# Patient Record
Sex: MALE | Race: BLACK OR AFRICAN AMERICAN | Employment: OTHER | URBAN - METROPOLITAN AREA
[De-identification: names, ages, dates, MRNs, and addresses within clinical notes are randomized per-mention and may not be internally consistent; named-entity substitution may affect disease eponyms.]

---

## 2018-06-27 ENCOUNTER — OFFICE VISIT (OUTPATIENT)
Dept: CARDIOLOGY CLINIC | Age: 63
End: 2018-06-27

## 2018-06-27 VITALS — HEART RATE: 52 BPM | DIASTOLIC BLOOD PRESSURE: 76 MMHG | SYSTOLIC BLOOD PRESSURE: 110 MMHG | WEIGHT: 224.2 LBS

## 2018-06-27 DIAGNOSIS — R06.02 SOB (SHORTNESS OF BREATH): Primary | ICD-10-CM

## 2018-06-27 PROCEDURE — 99204 OFFICE O/P NEW MOD 45 MIN: CPT | Performed by: INTERNAL MEDICINE

## 2018-06-27 RX ORDER — ATORVASTATIN CALCIUM 40 MG/1
TABLET, FILM COATED ORAL
COMMUNITY
Start: 2018-03-21

## 2018-06-27 RX ORDER — AMLODIPINE BESYLATE 10 MG/1
TABLET ORAL
COMMUNITY
Start: 2018-03-21 | End: 2022-05-05

## 2018-06-27 RX ORDER — ASPIRIN 81 MG/1
TABLET, CHEWABLE ORAL
COMMUNITY
Start: 2018-03-21 | End: 2018-06-27 | Stop reason: SDUPTHER

## 2018-06-27 RX ORDER — HYDROCHLOROTHIAZIDE 25 MG/1
TABLET ORAL
COMMUNITY
Start: 2018-03-21

## 2018-06-27 RX ORDER — OXYCODONE HYDROCHLORIDE AND ACETAMINOPHEN 5; 325 MG/1; MG/1
TABLET ORAL
COMMUNITY
Start: 2018-03-26

## 2018-07-09 ENCOUNTER — HOSPITAL ENCOUNTER (OUTPATIENT)
Dept: NON INVASIVE DIAGNOSTICS | Age: 63
Discharge: HOME | End: 2018-07-09
Attending: INTERNAL MEDICINE | Admitting: INTERNAL MEDICINE

## 2018-07-09 DIAGNOSIS — R06.02 SOB (SHORTNESS OF BREATH): ICD-10-CM

## 2018-07-09 LAB
LV EF: 58 %
LVEF MODALITY: NORMAL

## 2018-07-30 ENCOUNTER — OFFICE VISIT (OUTPATIENT)
Dept: CARDIOLOGY CLINIC | Age: 63
End: 2018-07-30

## 2018-07-30 VITALS
WEIGHT: 221.2 LBS | HEART RATE: 64 BPM | DIASTOLIC BLOOD PRESSURE: 64 MMHG | SYSTOLIC BLOOD PRESSURE: 110 MMHG | BODY MASS INDEX: 29.31 KG/M2 | HEIGHT: 73 IN

## 2018-07-30 DIAGNOSIS — Z72.0 TOBACCO USE: ICD-10-CM

## 2018-07-30 DIAGNOSIS — I10 ESSENTIAL HYPERTENSION: ICD-10-CM

## 2018-07-30 DIAGNOSIS — E78.49 OTHER HYPERLIPIDEMIA: Primary | ICD-10-CM

## 2018-07-30 PROCEDURE — 3017F COLORECTAL CA SCREEN DOC REV: CPT | Performed by: NURSE PRACTITIONER

## 2018-07-30 PROCEDURE — G8427 DOCREV CUR MEDS BY ELIG CLIN: HCPCS | Performed by: NURSE PRACTITIONER

## 2018-07-30 PROCEDURE — G8419 CALC BMI OUT NRM PARAM NOF/U: HCPCS | Performed by: NURSE PRACTITIONER

## 2018-07-30 PROCEDURE — 99214 OFFICE O/P EST MOD 30 MIN: CPT | Performed by: NURSE PRACTITIONER

## 2018-07-30 PROCEDURE — 4004F PT TOBACCO SCREEN RCVD TLK: CPT | Performed by: NURSE PRACTITIONER

## 2018-07-30 NOTE — LETTER
· All other ROS are reviewed and are unremarkable. PHYSICAL EXAM:      Wt Readings from Last 3 Encounters:   07/30/18 221 lb 3.2 oz (100.3 kg)   06/27/18 224 lb 3.2 oz (101.7 kg)       BP Readings from Last 3 Encounters:   07/30/18 110/64   06/27/18 110/76       Pulse Readings from Last 3 Encounters:   07/30/18 64   06/27/18 52       Exam   ENT: neg   NEUROLOGIC:  Neg   PSYCH: neg  SKIN: Warm and dry. LUNGS:  No increased work of breathing and clear to auscultation, no crackles or wheezing  CARDIOVASCULAR: RRR soft systolic murmur LLSB   ABDOMEN: soft   EXT: no edema     Assessment:    soft systolic LLSB murmur   Had recent echo 7/11/18 EF 55-60%   Left ventricular cavity size is normal with asymmetric hypertrophy of the septum. Trivial tricuspid regurgitation  No c/o cp SOB edema / sleeps well  Hx back problems on disability      tobacco use   40pk year smoker     HTN  Controlled    HLD  Per PCP   On  Lipitor      Plan:   Patient education on touch screen in room   Discussed heart heathy lifestyle including nutrition, exercise & importance of nonsmoking,       No c/o cp SOB edema / Discussed stress test / long time tobacco use / he dose not want a stress test   Fu in 6 months with blood work per PCP       [address]     If you have questions, please do not hesitate to call me. I look forward to following Ron Garcia along with you.     Sincerely,        MANJEET Herrera - CNP

## 2018-07-30 NOTE — PROGRESS NOTES
[address]   Dr Kory Sneed. Jackie ARCEO, [address]                                                 Outpatient Follow Up Note      CC: fu with murmur / tobacco use 40pk year smoker / HTN controlled / HLD    Had recent echo 7/11/18 EF 55-60%   Left ventricular cavity size is normal with asymmetric hypertrophy of the septum. Trivial tricuspid regurgitation  No c/o cp SOB edema / sleeps well  Hx back problems on disability      07/30/18  HPI:  Sunday Ansari is 58 y.o. male who presents today with murmur      Discussed echo with no changes in his meds    PMH:    History reviewed. No pertinent past medical history. [address]  History reviewed. No pertinent surgical history. SH:       History   Smoking Status    Current Every Day Smoker   Smokeless Tobacco    Never Used     Current Medications:  Prior to Visit Medications    Medication Sig Taking? Authorizing Provider   aspirin 81 MG tablet Take 81 mg by mouth daily Yes Historical Provider, MD   amLODIPine (NORVASC) 10 MG tablet  Yes Historical Provider, MD   atorvastatin (LIPITOR) 40 MG tablet  Yes Historical Provider, MD   hydrochlorothiazide (HYDRODIURIL) 25 MG tablet  Yes Historical Provider, MD   oxyCODONE-acetaminophen (PERCOCET) 5-325 MG per tablet  Yes Historical Provider, MD   Cholecalciferol (VITAMIN D3) 5000 units TABS Take by mouth Yes Historical Provider, MD     Family History  History reviewed. No pertinent family history. · ROS:   · Cardiovascular: Reviewed in HPI  · Allergic/Immunologic: No nasal congestion or hives. · All other ROS are reviewed and are unremarkable.     PHYSICAL EXAM:      Wt Readings from Last 3 Encounters:   07/30/18 221 lb 3.2 oz (100.3 kg)   06/27/18 224 lb 3.2 oz (101.7 kg)       BP Readings from Last 3 Encounters:   07/30/18 110/64   06/27/18 110/76       Pulse Readings from Last 3 Encounters:   07/30/18 64   06/27/18 52       Exam   ENT: neg   NEUROLOGIC:  Neg   PSYCH: neg  SKIN: Warm and dry. LUNGS:  No increased work of breathing and clear to auscultation, no crackles or wheezing  CARDIOVASCULAR: RRR soft systolic murmur LLSB   ABDOMEN: soft   EXT: no edema     Assessment:    soft systolic LLSB murmur   Had recent echo 7/11/18 EF 55-60%   Left ventricular cavity size is normal with asymmetric hypertrophy of the septum. Trivial tricuspid regurgitation  No c/o cp SOB edema / sleeps well  Hx back problems on disability      tobacco use   40pk year smoker     HTN  Controlled    HLD  Per PCP   On  Lipitor      Plan:   Patient education on touch screen in room   Discussed heart heathy lifestyle including nutrition, exercise & importance of nonsmoking,       No c/o cp SOB edema / Discussed stress test / long time tobacco use / he dose not want a stress test   Fu in 6 months with blood work per PCP       [address]

## 2019-02-14 ENCOUNTER — OFFICE VISIT (OUTPATIENT)
Dept: CARDIOLOGY CLINIC | Age: 64
End: 2019-02-14
Payer: MEDICARE

## 2019-02-14 VITALS
HEART RATE: 59 BPM | BODY MASS INDEX: 30.77 KG/M2 | SYSTOLIC BLOOD PRESSURE: 118 MMHG | DIASTOLIC BLOOD PRESSURE: 80 MMHG | WEIGHT: 233.2 LBS

## 2019-02-14 DIAGNOSIS — E78.49 OTHER HYPERLIPIDEMIA: ICD-10-CM

## 2019-02-14 DIAGNOSIS — I10 ESSENTIAL HYPERTENSION: Primary | ICD-10-CM

## 2019-02-14 PROCEDURE — 3017F COLORECTAL CA SCREEN DOC REV: CPT | Performed by: INTERNAL MEDICINE

## 2019-02-14 PROCEDURE — 1123F ACP DISCUSS/DSCN MKR DOCD: CPT | Performed by: INTERNAL MEDICINE

## 2019-02-14 PROCEDURE — G8484 FLU IMMUNIZE NO ADMIN: HCPCS | Performed by: INTERNAL MEDICINE

## 2019-02-14 PROCEDURE — 4040F PNEUMOC VAC/ADMIN/RCVD: CPT | Performed by: INTERNAL MEDICINE

## 2019-02-14 PROCEDURE — G8417 CALC BMI ABV UP PARAM F/U: HCPCS | Performed by: INTERNAL MEDICINE

## 2019-02-14 PROCEDURE — 4004F PT TOBACCO SCREEN RCVD TLK: CPT | Performed by: INTERNAL MEDICINE

## 2019-02-14 PROCEDURE — 99214 OFFICE O/P EST MOD 30 MIN: CPT | Performed by: INTERNAL MEDICINE

## 2019-02-14 PROCEDURE — 1101F PT FALLS ASSESS-DOCD LE1/YR: CPT | Performed by: INTERNAL MEDICINE

## 2019-02-14 PROCEDURE — G8427 DOCREV CUR MEDS BY ELIG CLIN: HCPCS | Performed by: INTERNAL MEDICINE

## 2020-05-06 ENCOUNTER — HOSPITAL ENCOUNTER (OUTPATIENT)
Dept: ULTRASOUND IMAGING | Age: 65
Discharge: HOME | End: 2020-05-06
Payer: MEDICARE

## 2020-05-06 PROCEDURE — 76700 US EXAM ABDOM COMPLETE: CPT

## 2020-09-24 ENCOUNTER — OFFICE VISIT (OUTPATIENT)
Dept: CARDIOLOGY CLINIC | Age: 65
End: 2020-09-24
Payer: MEDICARE

## 2020-09-24 VITALS
WEIGHT: 210 LBS | DIASTOLIC BLOOD PRESSURE: 70 MMHG | HEIGHT: 73 IN | HEART RATE: 65 BPM | BODY MASS INDEX: 27.83 KG/M2 | TEMPERATURE: 98.4 F | SYSTOLIC BLOOD PRESSURE: 129 MMHG

## 2020-09-24 PROCEDURE — 1123F ACP DISCUSS/DSCN MKR DOCD: CPT | Performed by: NURSE PRACTITIONER

## 2020-09-24 PROCEDURE — G8427 DOCREV CUR MEDS BY ELIG CLIN: HCPCS | Performed by: NURSE PRACTITIONER

## 2020-09-24 PROCEDURE — 99214 OFFICE O/P EST MOD 30 MIN: CPT | Performed by: NURSE PRACTITIONER

## 2020-09-24 PROCEDURE — 4040F PNEUMOC VAC/ADMIN/RCVD: CPT | Performed by: NURSE PRACTITIONER

## 2020-09-24 PROCEDURE — 3017F COLORECTAL CA SCREEN DOC REV: CPT | Performed by: NURSE PRACTITIONER

## 2020-09-24 PROCEDURE — 4004F PT TOBACCO SCREEN RCVD TLK: CPT | Performed by: NURSE PRACTITIONER

## 2020-09-24 PROCEDURE — G8417 CALC BMI ABV UP PARAM F/U: HCPCS | Performed by: NURSE PRACTITIONER

## 2020-09-24 NOTE — PROGRESS NOTES
[facility]  Office Visit           Jamila Fowler MD,  [address] FNP CVNP       Cardiology             Chari Bradshaw  [birth year]    Primary Cardiologist:  Migdalia Boston       CC:Interval Hx: Mr. Richy Brooks is here with c/o atypical pain in right arm, wax & wanes at rest  No c/o cp or back or left arm or neck pain   No SOB, no c/o nausea vomiting diarrhea h/a fever cough PND CRISTOBAL this has occurred over 1.5 months  Pain eases when lifting something / he feels it has something with his \"funny bone\" after stress test recommend to go to ortho     PMH: tobacco use cigars now    HLD controlled    Had recent echo 7/11/18 EF 55-60%   Left ventricular cavity size is normal with asymmetric hypertrophy of the septum. Trivial tricuspid regurgitation  No c/o cp SOB edema / sleeps well  Hx back problems on disability    I have examined pt and reviewed notes / any lab work EKGs stress test, angiograms, & images reviewed  I  have spent >20 minutes of face to face time with the patient with more than 50% spent counseling and coordinating care this patient. Review of Systems:  Constitutional: Denies  fatigue, weakness, night sweats or fever. HEENT: Denies new visual changes, ringing in ears, nosebleeds,nasal congestion  Respiratory: Denies new or change in SOB, PND, orthopnea or cough. Cardiovascular: see HPI  GI: Denies N/V, diarrhea, constipation, abdominal pain, change in bowel habits, melena or hematochezia  : Denies urinary frequency, urgency, incontinence, hematuria or dysuria. Skin: Denies rash, hives, or cyanosis  Musculoskeletal: Denies joint or muscle aches/pain  Neurological: Denies syncope or TIA-like symptoms. Psychiatric: Denies anxiety, insomnia or depression     No past medical history on file. No past surgical history on file. No family history on file.   Social History     Tobacco Use    Smoking status: Current Every Day Smoker  Smokeless tobacco: Never Used   Substance Use Topics    Alcohol use: Not on file    Drug use: Not on file       No Known Allergies  Current Outpatient Medications   Medication Sig Dispense Refill    aspirin 81 MG tablet Take 81 mg by mouth daily      amLODIPine (NORVASC) 10 MG tablet       atorvastatin (LIPITOR) 40 MG tablet       hydrochlorothiazide (HYDRODIURIL) 25 MG tablet       oxyCODONE-acetaminophen (PERCOCET) 5-325 MG per tablet       Cholecalciferol (VITAMIN D3) 5000 units TABS Take by mouth       No current facility-administered medications for this visit. Physical Exam:   /70   Pulse 65   Temp 98.4 °F (36.9 °C)   Ht 6' 1\" (1.854 m)   Wt 210 lb (95.3 kg)   BMI 27.71 kg/m²   BP Readings from Last 3 Encounters:   09/24/20 129/70   02/14/19 118/80   07/30/18 110/64     Pulse Readings from Last 3 Encounters:   09/24/20 65   02/14/19 59   07/30/18 64     Wt Readings from Last 3 Encounters:   09/24/20 210 lb (95.3 kg)   02/14/19 233 lb 3.2 oz (105.8 kg)   07/30/18 221 lb 3.2 oz (100.3 kg)     Constitutional: He is oriented to person, place, and time. He appears well-developed and well-nourished. In no acute distress. HEENT: Normocephalic and atraumatic. Sclerae anicteric. No xanthelasmas. Conjunctiva white, no subconjunctival hemorrhage   External inspection of ears nose teeth & gums   Eyes:PERRLA EOM's intact. Neck: Neck supple. No JVD present. Carotids without bruits. No mass and no thyromegaly present. No lymphadenopathy present. Cardiovascular: RRR, normal S1 and S2; soft murmur/gallop or rub, PMI nondisplaced  Pulmonary/Chest: Effort normal.  Lungs clear to auscultation. Chest wall nontender  Abdominal: soft, nontender, nondistended. + bowel sounds; no organomegaly or bruits. Aorta normal  Extremities: No edema, cyanosis, or clubbing. Pulses are 2+ radial/carotid/dorsalis pedis bilaterally. Cap refill brisk. Neurological: No cranial nerve deficit.    Psychiatric: He has a normal mood and affect.  His speech is normal and behavior is normal.     I have reviewed any available labs, images, any stress test, [facility] on this pt       Assessment:    Mr. Janeth Bowers is here with c/o atypical pain in right arm, wax & wanes at rest  Pain eases when lifting something / he feels it has something with his \"funny bone\" after stress test recommend to go to ortho   No c/o cp or back or left arm or neck pain   No SOB, no c/o nausea vomiting diarrhea h/a fever cough PND OSAthis has occurred over 1.5 months      tobacco use cigars now   Discussed cessation     HLD controlled    Plan:  Deanna Guardian for atypical pain with hx tobacco use / HTN HLD  Us carotid hx heavy tobacco use   Echo has soft murmur   Blood work  Fu in 3-4 weeks       Time OTONIEL Garcia

## 2020-09-24 NOTE — PATIENT INSTRUCTIONS
[facility] for atypical pain with hx tobacco use / HTN HLD  Us carotid hx heavy tobacco use   Echo has soft murmur   Blood work  Fu in 3-4 weeks

## 2020-10-02 ENCOUNTER — HOSPITAL ENCOUNTER (OUTPATIENT)
Dept: NON INVASIVE DIAGNOSTICS | Age: 65
Discharge: HOME | End: 2020-10-02
Payer: MEDICARE

## 2020-10-02 ENCOUNTER — HOSPITAL ENCOUNTER (OUTPATIENT)
Dept: VASCULAR LAB | Age: 65
Discharge: HOME | End: 2020-10-02
Payer: MEDICARE

## 2020-10-02 LAB
LV EF: 58 %
LV EF: 63 %
LVEF MODALITY: NORMAL
LVEF MODALITY: NORMAL

## 2020-10-02 PROCEDURE — A9502 TC99M TETROFOSMIN: HCPCS | Performed by: NURSE PRACTITIONER

## 2020-10-02 PROCEDURE — 2580000003 HC RX 258: Performed by: NURSE PRACTITIONER

## 2020-10-02 PROCEDURE — 93017 CV STRESS TEST TRACING ONLY: CPT

## 2020-10-02 PROCEDURE — 93306 TTE W/DOPPLER COMPLETE: CPT

## 2020-10-02 PROCEDURE — 78452 HT MUSCLE IMAGE SPECT MULT: CPT

## 2020-10-02 PROCEDURE — 3430000000 HC RX DIAGNOSTIC RADIOPHARMACEUTICAL: Performed by: NURSE PRACTITIONER

## 2020-10-02 PROCEDURE — 93880 EXTRACRANIAL BILAT STUDY: CPT

## 2020-10-02 PROCEDURE — 6360000002 HC RX W HCPCS: Performed by: NURSE PRACTITIONER

## 2020-10-02 RX ORDER — SODIUM CHLORIDE 0.9 % (FLUSH) 0.9 %
10 SYRINGE (ML) INJECTION 2 TIMES DAILY
Status: DISCONTINUED | OUTPATIENT
Start: 2020-10-02 | End: 2020-10-03 | Stop reason: HOSPADM

## 2020-10-02 RX ADMIN — Medication 10 ML: at 09:20

## 2020-10-02 RX ADMIN — REGADENOSON 0.4 MG: 0.08 INJECTION, SOLUTION INTRAVENOUS at 10:30

## 2020-10-02 RX ADMIN — TETROFOSMIN 10 MILLICURIE: 1.38 INJECTION, POWDER, LYOPHILIZED, FOR SOLUTION INTRAVENOUS at 09:20

## 2020-10-02 RX ADMIN — Medication 10 ML: at 10:30

## 2020-10-02 RX ADMIN — TETROFOSMIN 30.1 MILLICURIE: 1.38 INJECTION, POWDER, LYOPHILIZED, FOR SOLUTION INTRAVENOUS at 10:30

## 2020-10-29 ENCOUNTER — OFFICE VISIT (OUTPATIENT)
Dept: CARDIOLOGY CLINIC | Age: 65
End: 2020-10-29
Payer: MEDICARE

## 2020-10-29 VITALS
DIASTOLIC BLOOD PRESSURE: 78 MMHG | HEIGHT: 72 IN | HEART RATE: 79 BPM | TEMPERATURE: 96.4 F | WEIGHT: 207 LBS | SYSTOLIC BLOOD PRESSURE: 116 MMHG | BODY MASS INDEX: 28.04 KG/M2

## 2020-10-29 DIAGNOSIS — E55.9 VITAMIN D DEFICIENCY: ICD-10-CM

## 2020-10-29 DIAGNOSIS — Z72.0 TOBACCO USE: ICD-10-CM

## 2020-10-29 DIAGNOSIS — I10 ESSENTIAL HYPERTENSION: ICD-10-CM

## 2020-10-29 LAB
A/G RATIO: 1.5 (ref 1.1–2.2)
ALBUMIN SERPL-MCNC: 4.3 G/DL (ref 3.4–5)
ALP BLD-CCNC: 62 U/L (ref 40–129)
ALT SERPL-CCNC: 24 U/L (ref 10–40)
ANION GAP SERPL CALCULATED.3IONS-SCNC: 10 MMOL/L (ref 3–16)
AST SERPL-CCNC: 15 U/L (ref 15–37)
BILIRUB SERPL-MCNC: 0.4 MG/DL (ref 0–1)
BILIRUBIN DIRECT: <0.2 MG/DL (ref 0–0.3)
BILIRUBIN, INDIRECT: NORMAL MG/DL (ref 0–1)
BUN BLDV-MCNC: 12 MG/DL (ref 7–20)
CALCIUM SERPL-MCNC: 9.5 MG/DL (ref 8.3–10.6)
CHLORIDE BLD-SCNC: 103 MMOL/L (ref 99–110)
CHOLESTEROL, TOTAL: 145 MG/DL (ref 0–199)
CO2: 29 MMOL/L (ref 21–32)
CREAT SERPL-MCNC: 0.8 MG/DL (ref 0.8–1.3)
GFR AFRICAN AMERICAN: >60
GFR NON-AFRICAN AMERICAN: >60
GLOBULIN: 2.8 G/DL
GLUCOSE BLD-MCNC: 86 MG/DL (ref 70–99)
HCT VFR BLD CALC: 52.1 % (ref 40.5–52.5)
HDLC SERPL-MCNC: 41 MG/DL (ref 40–60)
HEMOGLOBIN: 17.4 G/DL (ref 13.5–17.5)
LDL CHOLESTEROL CALCULATED: 84 MG/DL
MAGNESIUM: 2.8 MG/DL (ref 1.8–2.4)
MCH RBC QN AUTO: 30.9 PG (ref 26–34)
MCHC RBC AUTO-ENTMCNC: 33.4 G/DL (ref 31–36)
MCV RBC AUTO: 92.4 FL (ref 80–100)
PDW BLD-RTO: 13.8 % (ref 12.4–15.4)
PLATELET # BLD: 214 K/UL (ref 135–450)
PMV BLD AUTO: 8.2 FL (ref 5–10.5)
POTASSIUM SERPL-SCNC: 4.9 MMOL/L (ref 3.5–5.1)
RBC # BLD: 5.63 M/UL (ref 4.2–5.9)
SODIUM BLD-SCNC: 142 MMOL/L (ref 136–145)
TOTAL PROTEIN: 7.1 G/DL (ref 6.4–8.2)
TRIGL SERPL-MCNC: 99 MG/DL (ref 0–150)
TSH REFLEX FT4: 1.02 UIU/ML (ref 0.27–4.2)
VITAMIN D 25-HYDROXY: 53.1 NG/ML
VLDLC SERPL CALC-MCNC: 20 MG/DL
WBC # BLD: 10 K/UL (ref 4–11)

## 2020-10-29 PROCEDURE — 4004F PT TOBACCO SCREEN RCVD TLK: CPT | Performed by: NURSE PRACTITIONER

## 2020-10-29 PROCEDURE — G8427 DOCREV CUR MEDS BY ELIG CLIN: HCPCS | Performed by: NURSE PRACTITIONER

## 2020-10-29 PROCEDURE — 4040F PNEUMOC VAC/ADMIN/RCVD: CPT | Performed by: NURSE PRACTITIONER

## 2020-10-29 PROCEDURE — G8484 FLU IMMUNIZE NO ADMIN: HCPCS | Performed by: NURSE PRACTITIONER

## 2020-10-29 PROCEDURE — 99214 OFFICE O/P EST MOD 30 MIN: CPT | Performed by: NURSE PRACTITIONER

## 2020-10-29 PROCEDURE — 1123F ACP DISCUSS/DSCN MKR DOCD: CPT | Performed by: NURSE PRACTITIONER

## 2020-10-29 PROCEDURE — G8417 CALC BMI ABV UP PARAM F/U: HCPCS | Performed by: NURSE PRACTITIONER

## 2020-10-29 PROCEDURE — 3017F COLORECTAL CA SCREEN DOC REV: CPT | Performed by: NURSE PRACTITIONER

## 2020-10-29 NOTE — PROGRESS NOTES
[facility]  Office Visit           Pete Cortez MD,  [address] [facility] FNP CVNP       Cardiology             Liana Ryan  1955 October 29, 2020    Primary Cardiologist:  Deirdre Lee     CC:Interval Hx: Mr Ananya Valente is here after testing for c/o chest  / hx of shoulder pain / remote tobacco use    VSS wt stable no c/o angina /SOB edema  sleeps well / denies PND or CRISTOBAL  Discussed results for test / no changes in care     PMH: tobacco use cigars now    HLD controlled     us carotid neg for sign stenosis    Stress test 10/20    Overall findings represent a low risk scan.     There is normal isotope uptake at stress and rest. There is no evidence of     myocardial ischemia or scar.     Normal LV function.     Normal myocardial perfusion study.  ECG: Non-diagnostic EKG response due to failure to reach target heart rate     TTE 10/20 Normal left ventricle size with mild to moderate asymmetric left ventricular   hypertrophy. Overall left ventricular systolic function appears normal with an ejection   fraction of 55-60%. No regional wall motion abnormalities   Diastolic filling parameters suggests normal diastolic function. Trace mitral and trcuspid regurgitation   Estimated pulmonary artery systolic pressure is 32 mmHg assuming a right   atrial pressure of 3 mmHg. The right atrium is dilated. Review of Systems:  Constitutional: Denies  fatigue, weakness, night sweats or fever. HEENT: Denies new visual changes, ringing in ears, nosebleeds,nasal congestion  Respiratory: Denies new or change in SOB, PND, orthopnea or cough. Cardiovascular: see HPI  GI: Denies N/V, diarrhea, constipation, abdominal pain, change in bowel habits, melena or hematochezia  : Denies urinary frequency, urgency, incontinence, hematuria or dysuria.   Skin: Denies rash, hives, or cyanosis  Musculoskeletal: Denies joint or muscle aches/pain  Neurological: Denies syncope or TIA-like symptoms. Psychiatric: Denies anxiety, insomnia or depression       No Known Allergies  Current Outpatient Medications   Medication Sig Dispense Refill    aspirin 81 MG tablet Take 81 mg by mouth daily      amLODIPine (NORVASC) 10 MG tablet       atorvastatin (LIPITOR) 40 MG tablet       hydrochlorothiazide (HYDRODIURIL) 25 MG tablet       oxyCODONE-acetaminophen (PERCOCET) 5-325 MG per tablet       Cholecalciferol (VITAMIN D3) 5000 units TABS Take by mouth       No current facility-administered medications for this visit. Physical Exam:   /78   Pulse 79   Temp 96.4 °F (35.8 °C)   Ht 6' (1.829 m)   Wt 207 lb (93.9 kg)   BMI 28.07 kg/m²   BP Readings from Last 3 Encounters:   10/29/20 116/78   09/24/20 129/70   02/14/19 118/80     Pulse Readings from Last 3 Encounters:   10/29/20 79   09/24/20 65   02/14/19 59     Wt Readings from Last 3 Encounters:   10/29/20 207 lb (93.9 kg)   09/24/20 210 lb (95.3 kg)   02/14/19 233 lb 3.2 oz (105.8 kg)     Constitutional: He is oriented to person, place, and time. He appears well-developed and well-nourished. In no acute distress. HEENT: Normocephalic and atraumatic. Sclerae anicteric. No xanthelasmas. Conjunctiva white, no subconjunctival hemorrhage   External inspection of ears nose teeth & gums   Eyes:PERRLA EOM's intact. Neck: Neck supple. No JVD present. Carotids without bruits. No mass and no thyromegaly present. No lymphadenopathy present. Cardiovascular: RRR, normal S1 and S2; no murmur/gallop or rub, PMI nondisplaced  Pulmonary/Chest: Effort normal.  Lungs clear to auscultation. Chest wall nontender  Abdominal: soft, nontender, nondistended. + bowel sounds; no organomegaly or bruits. Aorta normal  Extremities: No edema, cyanosis, or clubbing. Pulses are 2+ radial/carotid/dorsalis pedis bilaterally. Cap refill brisk. Neurological: No cranial nerve deficit. Psychiatric: He has a normal mood and affect.  His speech is normal and behavior is normal.     I have reviewed any available labs, images, any stress test, [facility] on this pt       Assessment:    Mr Elizabeth Lew is here after testing for c/o chest  / hx of shoulder pain / remote tobacco use      complaint with meds   Discussed nutrition / sodium intake / fluid intake   Recommend activity as tolerated     Stress test 10/20    Overall findings represent a low risk scan.     There is normal isotope uptake at stress and rest. There is no evidence of     myocardial ischemia or scar.     Normal LV function.     Normal myocardial perfusion study.  ECG: Non-diagnostic EKG response due to failure to reach target heart rate     TTE 10/20 Normal left ventricle size with mild to moderate asymmetric left ventricular   hypertrophy. Overall left ventricular systolic function appears normal with an ejection   fraction of 55-60%. No regional wall motion abnormalities   Diastolic filling parameters suggests normal diastolic function. Trace mitral and trcuspid regurgitation   Estimated pulmonary artery systolic pressure is 32 mmHg assuming a right   atrial pressure of 3 mmHg. The right atrium is dilated.     tobacco use   cigars now    discussed cessation     HLD   controlled     Hx heavy tobacco use  us carotid neg for sign stenosis      Plan:  Fu in 6 months with blood work       Ernesto Conception APRN,CVNP

## 2021-04-29 ENCOUNTER — OFFICE VISIT (OUTPATIENT)
Dept: CARDIOLOGY CLINIC | Age: 66
End: 2021-04-29
Payer: MEDICARE

## 2021-04-29 VITALS
WEIGHT: 214.2 LBS | HEIGHT: 73 IN | SYSTOLIC BLOOD PRESSURE: 110 MMHG | DIASTOLIC BLOOD PRESSURE: 70 MMHG | HEART RATE: 84 BPM | BODY MASS INDEX: 28.39 KG/M2

## 2021-04-29 DIAGNOSIS — I10 ESSENTIAL HYPERTENSION: Primary | ICD-10-CM

## 2021-04-29 DIAGNOSIS — I10 ESSENTIAL HYPERTENSION: ICD-10-CM

## 2021-04-29 LAB
A/G RATIO: 1.6 (ref 1.1–2.2)
ALBUMIN SERPL-MCNC: 4.3 G/DL (ref 3.4–5)
ALP BLD-CCNC: 50 U/L (ref 40–129)
ALT SERPL-CCNC: 22 U/L (ref 10–40)
ANION GAP SERPL CALCULATED.3IONS-SCNC: 10 MMOL/L (ref 3–16)
AST SERPL-CCNC: 23 U/L (ref 15–37)
BILIRUB SERPL-MCNC: 0.8 MG/DL (ref 0–1)
BILIRUBIN DIRECT: <0.2 MG/DL (ref 0–0.3)
BILIRUBIN, INDIRECT: NORMAL MG/DL (ref 0–1)
BUN BLDV-MCNC: 16 MG/DL (ref 7–20)
CALCIUM SERPL-MCNC: 9.2 MG/DL (ref 8.3–10.6)
CHLORIDE BLD-SCNC: 103 MMOL/L (ref 99–110)
CHOLESTEROL, TOTAL: 134 MG/DL (ref 0–199)
CO2: 32 MMOL/L (ref 21–32)
CREAT SERPL-MCNC: 0.9 MG/DL (ref 0.8–1.3)
GFR AFRICAN AMERICAN: >60
GFR NON-AFRICAN AMERICAN: >60
GLOBULIN: 2.7 G/DL
GLUCOSE BLD-MCNC: 90 MG/DL (ref 70–99)
HCT VFR BLD CALC: 49 % (ref 40.5–52.5)
HDLC SERPL-MCNC: 39 MG/DL (ref 40–60)
HEMOGLOBIN: 16.6 G/DL (ref 13.5–17.5)
LDL CHOLESTEROL CALCULATED: 77 MG/DL
MAGNESIUM: 2.2 MG/DL (ref 1.8–2.4)
MCH RBC QN AUTO: 31.2 PG (ref 26–34)
MCHC RBC AUTO-ENTMCNC: 33.8 G/DL (ref 31–36)
MCV RBC AUTO: 92.1 FL (ref 80–100)
PDW BLD-RTO: 14.3 % (ref 12.4–15.4)
PLATELET # BLD: 204 K/UL (ref 135–450)
PMV BLD AUTO: 8.4 FL (ref 5–10.5)
POTASSIUM SERPL-SCNC: 4.1 MMOL/L (ref 3.5–5.1)
RBC # BLD: 5.32 M/UL (ref 4.2–5.9)
SODIUM BLD-SCNC: 145 MMOL/L (ref 136–145)
TOTAL PROTEIN: 7 G/DL (ref 6.4–8.2)
TRIGL SERPL-MCNC: 91 MG/DL (ref 0–150)
TSH REFLEX FT4: 0.81 UIU/ML (ref 0.27–4.2)
VITAMIN D 25-HYDROXY: 51.4 NG/ML
VLDLC SERPL CALC-MCNC: 18 MG/DL
WBC # BLD: 10.5 K/UL (ref 4–11)

## 2021-04-29 PROCEDURE — 4004F PT TOBACCO SCREEN RCVD TLK: CPT | Performed by: NURSE PRACTITIONER

## 2021-04-29 PROCEDURE — G8427 DOCREV CUR MEDS BY ELIG CLIN: HCPCS | Performed by: NURSE PRACTITIONER

## 2021-04-29 PROCEDURE — 99214 OFFICE O/P EST MOD 30 MIN: CPT | Performed by: NURSE PRACTITIONER

## 2021-04-29 PROCEDURE — 1123F ACP DISCUSS/DSCN MKR DOCD: CPT | Performed by: NURSE PRACTITIONER

## 2021-04-29 PROCEDURE — G8417 CALC BMI ABV UP PARAM F/U: HCPCS | Performed by: NURSE PRACTITIONER

## 2021-04-29 PROCEDURE — 3017F COLORECTAL CA SCREEN DOC REV: CPT | Performed by: NURSE PRACTITIONER

## 2021-04-29 PROCEDURE — 4040F PNEUMOC VAC/ADMIN/RCVD: CPT | Performed by: NURSE PRACTITIONER

## 2021-04-29 NOTE — PROGRESS NOTES
depression        Objective:   PHYSICAL EXAM:        Vitals:    04/29/21 0954   BP: 110/70   Pulse: 84   Weight: 214 lb 3.2 oz (97.2 kg)   Height: 6' 1\" (1.854 m)      VITALS:  /70   Pulse 84   Ht 6' 1\" (1.854 m)   Wt 214 lb 3.2 oz (97.2 kg)   BMI 28.26 kg/m²   CONSTITUTIONAL: Cooperative, no apparent distress, and appears well nourished / developed  NEUROLOGIC:  Awake and orientated to person, place, and time. PSYCH: Calm affect. SKIN: Warm and dry. HEENT: Sclera non-icteric, normocephalic, neck supple. RESPIRATORY:  No increased work of breathing and clear to auscultation, no crackles or wheezing. CARDIOVASCULAR:  Regular rate and rhythm without murmur. Normal S1 and S2. No gallops or rubs. Normal PMI. No elevation of JVP. Normal carotid pulses with no bruits. GI:  Normal bowel sounds. Non-distended. Non-tender to palpation  EXT: No edema. No calf tenderness. Pulses are present bilaterally.     Wt Readings from Last 3 Encounters:   04/29/21 214 lb 3.2 oz (97.2 kg)   10/29/20 207 lb (93.9 kg)   09/24/20 210 lb (95.3 kg)      Pulse Readings from Last 3 Encounters:   04/29/21 84   10/29/20 79   09/24/20 65     BP Readings from Last 3 Encounters:   04/29/21 110/70   10/29/20 116/78   09/24/20 129/70        DATA:    Lab Results   Component Value Date    ALT 24 10/29/2020    AST 15 10/29/2020    ALKPHOS 62 10/29/2020    BILITOT 0.4 10/29/2020     Lab Results   Component Value Date    CREATININE 0.8 10/29/2020    BUN 12 10/29/2020     10/29/2020    K 4.9 10/29/2020     10/29/2020    CO2 29 10/29/2020     Lab Results   Component Value Date    WBC 10.0 10/29/2020    HGB 17.4 10/29/2020    HCT 52.1 10/29/2020    MCV 92.4 10/29/2020     10/29/2020     No components found for: CHLPL  Lab Results   Component Value Date    TRIG 99 10/29/2020     Lab Results   Component Value Date    HDL 41 10/29/2020     Lab Results   Component Value Date    LDLCALC 84 10/29/2020     Lab Results   Component Value Date    LABVLDL 20 10/29/2020      No results found for: BNP  Radiology Review:  Pertinent images / reports were reviewed as a part of this visit and reveals the following:    Assessment:     HTN   Optimal    Stress test 10/20 neg / TTE EF wnl      HLD  LDL 84   Optimal     Tobacco use cigars  cessation discussed       fatigued \"at times since COVID vaccines\"     Plan:   Overall the patient is stable from CV standpoint  Fu in 6 months with blood work soon      I have addressed the patient's cardiac risk factors and adjusted pharmacologic treatment as needed. In addition, I have reinforced the need for patient directed risk factor modification. Further evaluation will be based upon the patient's clinical course and testing results. All questions and concerns were addressed to the patient. Alternatives to my treatment were discussed. The patient verbalizes understanding not to stop medications without discussing with us. Discussed exercise: 30min of sustained cardiovascular exercise most days of the week   Discussed Low saturated fat / Cholesterol diet. Thank you for allowing us to participate in the care of Nadine Fisher.     Criselda Floor APRN FNP [address]     Documentation of today's visit sent to PCP

## 2021-05-04 LAB
PROSTATE SPECIFIC ANTIGEN FREE: 0.2 UG/L
PROSTATE SPECIFIC ANTIGEN PERCENT FREE: 25 %
PROSTATE SPECIFIC ANTIGEN: 0.8 UG/L (ref 0–4)

## 2021-11-04 ENCOUNTER — OFFICE VISIT (OUTPATIENT)
Dept: CARDIOLOGY CLINIC | Age: 66
End: 2021-11-04
Payer: MEDICARE

## 2021-11-04 VITALS
WEIGHT: 204 LBS | SYSTOLIC BLOOD PRESSURE: 106 MMHG | HEART RATE: 77 BPM | DIASTOLIC BLOOD PRESSURE: 64 MMHG | HEIGHT: 72 IN | OXYGEN SATURATION: 97 % | BODY MASS INDEX: 27.63 KG/M2

## 2021-11-04 DIAGNOSIS — Z72.0 TOBACCO USE: Primary | ICD-10-CM

## 2021-11-04 DIAGNOSIS — I10 ESSENTIAL HYPERTENSION: ICD-10-CM

## 2021-11-04 PROCEDURE — 3017F COLORECTAL CA SCREEN DOC REV: CPT | Performed by: NURSE PRACTITIONER

## 2021-11-04 PROCEDURE — G8484 FLU IMMUNIZE NO ADMIN: HCPCS | Performed by: NURSE PRACTITIONER

## 2021-11-04 PROCEDURE — 4040F PNEUMOC VAC/ADMIN/RCVD: CPT | Performed by: NURSE PRACTITIONER

## 2021-11-04 PROCEDURE — G8427 DOCREV CUR MEDS BY ELIG CLIN: HCPCS | Performed by: NURSE PRACTITIONER

## 2021-11-04 PROCEDURE — 1123F ACP DISCUSS/DSCN MKR DOCD: CPT | Performed by: NURSE PRACTITIONER

## 2021-11-04 PROCEDURE — G8417 CALC BMI ABV UP PARAM F/U: HCPCS | Performed by: NURSE PRACTITIONER

## 2021-11-04 PROCEDURE — 99214 OFFICE O/P EST MOD 30 MIN: CPT | Performed by: NURSE PRACTITIONER

## 2021-11-04 PROCEDURE — 4004F PT TOBACCO SCREEN RCVD TLK: CPT | Performed by: NURSE PRACTITIONER

## 2021-11-04 NOTE — PROGRESS NOTES
symptoms. PSYCHIATRIC: No anxiety, pain, insomnia or depression        Objective:   PHYSICAL EXAM:        Vitals:    11/04/21 1200   BP: 106/64   Pulse: 77   SpO2: 97%   Weight: 204 lb (92.5 kg)   Height: 6' (1.829 m)      VITALS:  /64   Pulse 77   Ht 6' (1.829 m)   Wt 204 lb (92.5 kg)   SpO2 97%   BMI 27.67 kg/m²   CONSTITUTIONAL: Cooperative, no apparent distress, and appears well nourished / developed  NEUROLOGIC:  Awake and orientated to person, place, and time. PSYCH: Calm affect. SKIN: Warm and dry. HEENT: Sclera non-icteric, normocephalic, neck supple. RESPIRATORY:  No increased work of breathing and clear to auscultation, no crackles or wheezing. CARDIOVASCULAR:  Regular rate and rhythm without murmur. Normal S1 and S2. No gallops or rubs. Normal PMI. No elevation of JVP. Normal carotid pulses with no bruits. GI:  Normal bowel sounds. Non-distended. Non-tender to palpation  EXT: No edema. No calf tenderness. Pulses are present bilaterally.     Wt Readings from Last 3 Encounters:   11/04/21 204 lb (92.5 kg)   04/29/21 214 lb 3.2 oz (97.2 kg)   10/29/20 207 lb (93.9 kg)      Pulse Readings from Last 3 Encounters:   11/04/21 77   04/29/21 84   10/29/20 79     BP Readings from Last 3 Encounters:   11/04/21 106/64   04/29/21 110/70   10/29/20 116/78        DATA:    Lab Results   Component Value Date    ALT 22 04/29/2021    AST 23 04/29/2021    ALKPHOS 50 04/29/2021    BILITOT 0.8 04/29/2021     Lab Results   Component Value Date    CREATININE 0.9 04/29/2021    BUN 16 04/29/2021     04/29/2021    K 4.1 04/29/2021     04/29/2021    CO2 32 04/29/2021     Lab Results   Component Value Date    WBC 10.5 04/29/2021    HGB 16.6 04/29/2021    HCT 49.0 04/29/2021    MCV 92.1 04/29/2021     04/29/2021     No components found for: CHLPL  Lab Results   Component Value Date    TRIG 91 04/29/2021    TRIG 99 10/29/2020     Lab Results   Component Value Date    HDL 39 (L) 04/29/2021    HDL 41 10/29/2020     Lab Results   Component Value Date    LDLCALC 77 04/29/2021    [address] 84 10/29/2020     Lab Results   Component Value Date    LABVLDL 18 04/29/2021    LABVLDL 20 10/29/2020      No results found for: BNP  Radiology Review:  Pertinent images / reports were reviewed as a part of this visit and reveals the following:    Assessment:     HTN   Optimal    complaint with meds   Discussed nutrition / sodium intake / fluid intake   Recommend activity as tolerated       Stress test 10/20 neg / TTE EF wnl      HLD  LDL 84   Optimal     Tobacco use cigars  cessation discussed     Plan:   Fu in 6 months with blood work

## 2022-05-05 ENCOUNTER — OFFICE VISIT (OUTPATIENT)
Dept: CARDIOLOGY CLINIC | Age: 67
End: 2022-05-05
Payer: MEDICARE

## 2022-05-05 VITALS
HEART RATE: 54 BPM | WEIGHT: 200.9 LBS | DIASTOLIC BLOOD PRESSURE: 80 MMHG | BODY MASS INDEX: 27.25 KG/M2 | SYSTOLIC BLOOD PRESSURE: 106 MMHG

## 2022-05-05 DIAGNOSIS — I10 ESSENTIAL HYPERTENSION: ICD-10-CM

## 2022-05-05 DIAGNOSIS — I10 ESSENTIAL HYPERTENSION: Primary | ICD-10-CM

## 2022-05-05 LAB
A/G RATIO: 1.6 (ref 1.1–2.2)
ALBUMIN SERPL-MCNC: 4.5 G/DL (ref 3.4–5)
ALP BLD-CCNC: 65 U/L (ref 40–129)
ALT SERPL-CCNC: 22 U/L (ref 10–40)
ANION GAP SERPL CALCULATED.3IONS-SCNC: 12 MMOL/L (ref 3–16)
AST SERPL-CCNC: 21 U/L (ref 15–37)
BILIRUB SERPL-MCNC: 0.7 MG/DL (ref 0–1)
BILIRUBIN DIRECT: <0.2 MG/DL (ref 0–0.3)
BILIRUBIN, INDIRECT: NORMAL MG/DL (ref 0–1)
BUN BLDV-MCNC: 10 MG/DL (ref 7–20)
CALCIUM SERPL-MCNC: 9.8 MG/DL (ref 8.3–10.6)
CHLORIDE BLD-SCNC: 99 MMOL/L (ref 99–110)
CHOLESTEROL, TOTAL: 150 MG/DL (ref 0–199)
CO2: 28 MMOL/L (ref 21–32)
CREAT SERPL-MCNC: 0.8 MG/DL (ref 0.8–1.3)
GFR AFRICAN AMERICAN: >60
GFR NON-AFRICAN AMERICAN: >60
GLUCOSE BLD-MCNC: 89 MG/DL (ref 70–99)
HCT VFR BLD CALC: 49.8 % (ref 40.5–52.5)
HDLC SERPL-MCNC: 48 MG/DL (ref 40–60)
HEMOGLOBIN: 16.7 G/DL (ref 13.5–17.5)
LDL CHOLESTEROL CALCULATED: 83 MG/DL
MAGNESIUM: 2.3 MG/DL (ref 1.8–2.4)
MCH RBC QN AUTO: 30.8 PG (ref 26–34)
MCHC RBC AUTO-ENTMCNC: 33.6 G/DL (ref 31–36)
MCV RBC AUTO: 91.8 FL (ref 80–100)
PDW BLD-RTO: 14.6 % (ref 12.4–15.4)
PLATELET # BLD: 240 K/UL (ref 135–450)
PMV BLD AUTO: 7.3 FL (ref 5–10.5)
POTASSIUM SERPL-SCNC: 4.1 MMOL/L (ref 3.5–5.1)
RBC # BLD: 5.42 M/UL (ref 4.2–5.9)
SODIUM BLD-SCNC: 139 MMOL/L (ref 136–145)
TOTAL PROTEIN: 7.3 G/DL (ref 6.4–8.2)
TRIGL SERPL-MCNC: 95 MG/DL (ref 0–150)
VITAMIN D 25-HYDROXY: 43 NG/ML
VLDLC SERPL CALC-MCNC: 19 MG/DL
WBC # BLD: 10.5 K/UL (ref 4–11)

## 2022-05-05 PROCEDURE — 99214 OFFICE O/P EST MOD 30 MIN: CPT | Performed by: NURSE PRACTITIONER

## 2022-05-05 PROCEDURE — 1123F ACP DISCUSS/DSCN MKR DOCD: CPT | Performed by: NURSE PRACTITIONER

## 2022-05-05 PROCEDURE — 93000 ELECTROCARDIOGRAM COMPLETE: CPT | Performed by: NURSE PRACTITIONER

## 2022-05-05 PROCEDURE — 3017F COLORECTAL CA SCREEN DOC REV: CPT | Performed by: NURSE PRACTITIONER

## 2022-05-05 PROCEDURE — 4040F PNEUMOC VAC/ADMIN/RCVD: CPT | Performed by: NURSE PRACTITIONER

## 2022-05-05 PROCEDURE — 4004F PT TOBACCO SCREEN RCVD TLK: CPT | Performed by: NURSE PRACTITIONER

## 2022-05-05 PROCEDURE — G8427 DOCREV CUR MEDS BY ELIG CLIN: HCPCS | Performed by: NURSE PRACTITIONER

## 2022-05-05 PROCEDURE — G8417 CALC BMI ABV UP PARAM F/U: HCPCS | Performed by: NURSE PRACTITIONER

## 2022-05-05 RX ORDER — AMLODIPINE BESYLATE 5 MG/1
5 TABLET ORAL DAILY
Qty: 90 TABLET | Refills: 2 | Status: SHIPPED | OUTPATIENT
Start: 2022-05-05

## 2022-05-05 NOTE — PROGRESS NOTES
[facility]     Outpatient Follow Up Note  Dr Rosana Hurst MD,  Parul Boudreaux APRN,CNP CVNP      CHIEF COMPLAINT / HPI: Willie Paris is 75 y.o. male who presents today with a history of HTN HLD LDL 77 on statin   Tobacco use cigars,   Stress test 10/20 neg / TTE EF wnl      Interval history:  VSS no c/o cp/SOB/edema/PND / wt down 4#    b/p on low side here and at home decrease norvasc from 10mg to 5 mg daily   complaint with meds   Discussed nutrition / sodium intake / fluid intake   Recommend activity as tolerated   Fu in 6 months with blood work     Last EKG Echo:Stress Test:if any any past Angiograms: last labs; reviewed with pt. / the results are utilized to determine my plan of care. 20-29 minutes with pt including reviewing tests/meds/plan of care       Past Medical History:   Diagnosis Date    Hyperlipidemia     Hypertension      Social History:    Social History     Tobacco Use   Smoking Status Current Every Day Smoker   Smokeless Tobacco Never Used     Current Medications:  Current Outpatient Medications   Medication Sig Dispense Refill    aspirin 81 MG tablet Take 81 mg by mouth daily      amLODIPine (NORVASC) 10 MG tablet       atorvastatin (LIPITOR) 40 MG tablet       hydrochlorothiazide (HYDRODIURIL) 25 MG tablet       oxyCODONE-acetaminophen (PERCOCET) 5-325 MG per tablet       Cholecalciferol (VITAMIN D3) 5000 units TABS Take by mouth       No current facility-administered medications for this visit. REVIEW OF SYSTEMS:    CONSTITUTIONAL: No major weight gain or loss, night sweats, fever, fatigue, or weakness. HEENT: No new vision difficulties or ringing in the ears. RESPIRATORY: No new SOB, PND, orthopnea or cough. CARDIOVASCULAR: See HPI  GI: No N/V/D, constipation, or abdominal pain. No black/tarry stools  : No urinary urgency, incontinence, or hematuria. SKIN: No cyanosis or skin lesions. MUSCULOSKELETAL: No new muscle or joint pain.   NEUROLOGICAL: No syncope or TIA-like symptoms. PSYCHIATRIC: No anxiety, pain, insomnia or depression        Objective:   PHYSICAL EXAM:        Vitals:    05/05/22 1117   BP: 106/80   Pulse: 54   Weight: 200 lb 14.4 oz (91.1 kg)      VITALS:  /80   Pulse 54   Wt 200 lb 14.4 oz (91.1 kg)   BMI 27.25 kg/m²   CONSTITUTIONAL: Cooperative, no apparent distress, and appears well nourished / developed  NEUROLOGIC:  Awake and orientated to person, place, and time. PSYCH: Calm affect. SKIN: Warm and dry. HEENT: Sclera non-icteric, normocephalic, neck supple. RESPIRATORY:  No increased work of breathing and clear to auscultation, no crackles or wheezing. CARDIOVASCULAR:  Regular rate and rhythm without murmur. Normal S1 and S2. No gallops or rubs. Normal PMI. No elevation of JVP. Normal carotid pulses with no bruits. GI:  Normal bowel sounds. Non-distended. Non-tender to palpation  EXT: No edema. No calf tenderness. Pulses are present bilaterally.     Wt Readings from Last 3 Encounters:   05/05/22 200 lb 14.4 oz (91.1 kg)   11/04/21 204 lb (92.5 kg)   04/29/21 214 lb 3.2 oz (97.2 kg)      Pulse Readings from Last 3 Encounters:   05/05/22 54   11/04/21 77   04/29/21 84     BP Readings from Last 3 Encounters:   05/05/22 106/80   11/04/21 106/64   04/29/21 110/70      Lab Results   Component Value Date    ALT 22 04/29/2021    AST 23 04/29/2021    ALKPHOS 50 04/29/2021    BILITOT 0.8 04/29/2021     Lab Results   Component Value Date    CREATININE 0.9 04/29/2021    BUN 16 04/29/2021     04/29/2021    K 4.1 04/29/2021     04/29/2021    CO2 32 04/29/2021     Lab Results   Component Value Date    WBC 10.5 04/29/2021    HGB 16.6 04/29/2021    HCT 49.0 04/29/2021    MCV 92.1 04/29/2021     04/29/2021       Lab Results   Component Value Date    TRIG 91 04/29/2021    TRIG 99 10/29/2020     Lab Results   Component Value Date    HDL 39 (L) 04/29/2021    HDL 41 10/29/2020     Lab Results   Component Value Date    LDLCALC 77 04/29/2021    LDLCALC 84 10/29/2020     Lab Results   Component Value Date    LABVLDL 18 04/29/2021    LABVLDL 20 10/29/2020      No results found for: BNP  Radiology Review:  Pertinent images / reports were reviewed as a part of this visit and reveals the following:    Assessment:     HTN   b/p on low side here and at home  / decrease norvasc from 10mg to 5 mg daily     complaint with meds   Discussed nutrition / sodium intake / fluid intake   Recommend activity as tolerated     Stress test 10/20 neg / TTE EF wnl      HLD  LDL 84   Optimal     Tobacco use cigars  cessation discussed     Plan:   b/p on low side here and at home decrease norvasc from 10mg to 5 mg daily   Fu in 6 months with blood work today

## 2022-05-05 NOTE — PATIENT INSTRUCTIONS
b/p on low side here and at home decrease norvasc from 10mg to 5 mg daily   Fu 6 [facility]  Blood work today